# Patient Record
Sex: MALE | Race: WHITE
[De-identification: names, ages, dates, MRNs, and addresses within clinical notes are randomized per-mention and may not be internally consistent; named-entity substitution may affect disease eponyms.]

---

## 2018-09-17 ENCOUNTER — HOSPITAL ENCOUNTER (OUTPATIENT)
Dept: HOSPITAL 74 - JASU-ENDO | Age: 58
Discharge: HOME | End: 2018-09-17
Attending: INTERNAL MEDICINE
Payer: COMMERCIAL

## 2018-09-17 VITALS — DIASTOLIC BLOOD PRESSURE: 80 MMHG | HEART RATE: 50 BPM | SYSTOLIC BLOOD PRESSURE: 119 MMHG

## 2018-09-17 VITALS — BODY MASS INDEX: 25.4 KG/M2

## 2018-09-17 VITALS — TEMPERATURE: 97.6 F

## 2018-09-17 DIAGNOSIS — K64.4: ICD-10-CM

## 2018-09-17 DIAGNOSIS — D12.3: ICD-10-CM

## 2018-09-17 DIAGNOSIS — Z12.11: Primary | ICD-10-CM

## 2018-09-17 DIAGNOSIS — D12.0: ICD-10-CM

## 2018-09-17 PROCEDURE — 0DBH8ZX EXCISION OF CECUM, VIA NATURAL OR ARTIFICIAL OPENING ENDOSCOPIC, DIAGNOSTIC: ICD-10-PCS | Performed by: INTERNAL MEDICINE

## 2018-09-17 PROCEDURE — 0DBL8ZX EXCISION OF TRANSVERSE COLON, VIA NATURAL OR ARTIFICIAL OPENING ENDOSCOPIC, DIAGNOSTIC: ICD-10-PCS | Performed by: INTERNAL MEDICINE

## 2018-09-20 NOTE — PATH
Surgical Pathology Report



Patient Name:  SHINE GREEN

Accession #:  [id number]

[facility]. Rec. #:  [id number]                                                        

   /Age/Gender:  1960 (Age: 58) / M

Account:  [id number]                                                          

             Location: ASU-ENDOSCOPY

Taken:  2018

Received:  2018

Reported:  2018

Physicians:  Ang Mcfarland D.O.

  



Specimen(s) Received

A: BX CECAL POLYP 

B: BX PROXIMAL TRANSVERSE COLON POLYP 

C: BX ANAL PAPILLA 





Clinical History

Colon screening, hemorrhage of rectum

Postoperative diagnosis: Polyps, anal papilla







Final Diagnosis

A. CECUM, POLYP, POLYPECTOMY:

POLYPOID COLONIC MUCOSA WITH SMALL LYMPHOID AGGREGATE.



B. PROXIMAL TRANSVERSE COLON, POLYP, POLYPECTOMY:

TUBULAR ADENOMA.



C. ANAL PAPILLA, BIOPSY:

POLYPOID SQUAMOUS MUCOSA CONSISTENT WITH HYPERTROPHIED PAPILLA.

SEE COMMENT.



Comment: HPV in situ hybridization (low risk, subtypes 6/11) performed and

interpreted at [place], [place] ([id number]) is negative.







***Electronically Signed***

Keri Khan M.D.





Gross Description

A.  Received in formalin, labeled "biopsy cecal polyp" are 2 tan, irregular

portions of soft tissue averaging 0.3 cm. in greatest dimension. The specimens

are submitted in toto in one cassette.



B.  Received in formalin, labeled "biopsy proximal transverse colon polyp" is a

tan, irregular portion of soft tissue measuring 0.4 cm. in greatest dimension.

The specimen is submitted in toto in one cassette.



C. Received in formalin labeled "biopsy anal papilla," is a 0.7 x 0.5 x 0.1 cm

aggregate of tan soft tissue fragments. The formalin is filtered and the

specimen is entirely submitted in one cassette.

DL/2018



saudi/2018

## 2022-06-17 ENCOUNTER — HOSPITAL ENCOUNTER (OUTPATIENT)
Dept: HOSPITAL 74 - FASU | Age: 62
Discharge: HOME | End: 2022-06-17
Attending: ORTHOPAEDIC SURGERY
Payer: COMMERCIAL

## 2022-06-17 VITALS — SYSTOLIC BLOOD PRESSURE: 121 MMHG | HEART RATE: 57 BPM | DIASTOLIC BLOOD PRESSURE: 77 MMHG

## 2022-06-17 VITALS — TEMPERATURE: 97.1 F

## 2022-06-17 VITALS — BODY MASS INDEX: 24.9 KG/M2

## 2022-06-17 DIAGNOSIS — M67.813: ICD-10-CM
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
## 2022-06-17 DIAGNOSIS — M75.111: Primary | ICD-10-CM

## 2022-06-17 DIAGNOSIS — M75.01: ICD-10-CM

## 2022-06-17 DIAGNOSIS — S43.431A: ICD-10-CM

## 2022-06-17 DIAGNOSIS — M65.811: ICD-10-CM

## 2022-06-17 DIAGNOSIS — M94.211: ICD-10-CM
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
## 2022-06-17 PROCEDURE — 0LQ14ZZ REPAIR RIGHT SHOULDER TENDON, PERCUTANEOUS ENDOSCOPIC APPROACH: ICD-10-PCS | Performed by: ORTHOPAEDIC SURGERY

## 2022-06-17 PROCEDURE — 0LS30ZZ REPOSITION RIGHT UPPER ARM TENDON, OPEN APPROACH: ICD-10-PCS | Performed by: ORTHOPAEDIC SURGERY

## 2022-06-17 PROCEDURE — 0RBJ4ZZ EXCISION OF RIGHT SHOULDER JOINT, PERCUTANEOUS ENDOSCOPIC APPROACH: ICD-10-PCS | Performed by: ORTHOPAEDIC SURGERY

## 2022-06-17 PROCEDURE — 0RNJ4ZZ RELEASE RIGHT SHOULDER JOINT, PERCUTANEOUS ENDOSCOPIC APPROACH: ICD-10-PCS | Performed by: ORTHOPAEDIC SURGERY

## 2023-04-11 ENCOUNTER — OFFICE (OUTPATIENT)
Dept: URBAN - METROPOLITAN AREA CLINIC 121 | Facility: CLINIC | Age: 63
Setting detail: OPHTHALMOLOGY
End: 2023-04-11
Payer: COMMERCIAL

## 2023-04-11 DIAGNOSIS — H35.363: ICD-10-CM

## 2023-04-11 DIAGNOSIS — E11.9: ICD-10-CM

## 2023-04-11 DIAGNOSIS — H25.13: ICD-10-CM

## 2023-04-11 DIAGNOSIS — H16.223: ICD-10-CM

## 2023-04-11 DIAGNOSIS — H35.033: ICD-10-CM

## 2023-04-11 DIAGNOSIS — H40.013: ICD-10-CM

## 2023-04-11 PROCEDURE — 92012 INTRM OPH EXAM EST PATIENT: CPT | Performed by: OPHTHALMOLOGY

## 2023-04-11 PROCEDURE — 92134 CPTRZ OPH DX IMG PST SGM RTA: CPT | Performed by: OPHTHALMOLOGY

## 2023-04-11 ASSESSMENT — REFRACTION_CURRENTRX
OD_OVR_VA: 20/
OS_OVR_VA: 20/
OS_VPRISM_DIRECTION: SV
OD_SPHERE: +2.50
OD_VPRISM_DIRECTION: SV
OS_SPHERE: +2.50

## 2023-04-11 ASSESSMENT — KERATOMETRY
OD_AXISANGLE_DEGREES: 069
OD_K1POWER_DIOPTERS: 45.25
OS_AXISANGLE_DEGREES: 124
METHOD_AUTO_MANUAL: AUTO
OS_K1POWER_DIOPTERS: 45.25
OS_K2POWER_DIOPTERS: 46.25
OD_K2POWER_DIOPTERS: 46.00

## 2023-04-11 ASSESSMENT — SUPERFICIAL PUNCTATE KERATITIS (SPK)
OS_SPK: T
OD_SPK: T

## 2023-04-11 ASSESSMENT — PACHYMETRY
OS_CT_CORRECTION: -3
OD_CT_UM: 574
OS_CT_UM: 585
OD_CT_CORRECTION: -2

## 2023-04-11 ASSESSMENT — CONFRONTATIONAL VISUAL FIELD TEST (CVF)
OS_FINDINGS: FULL
OD_FINDINGS: FULL

## 2023-04-11 ASSESSMENT — AXIALLENGTH_DERIVED
OS_AL: 22.0886
OD_AL: 22.1718

## 2023-04-11 ASSESSMENT — TONOMETRY
OD_IOP_MMHG: 16
OS_IOP_MMHG: 16

## 2023-04-11 ASSESSMENT — REFRACTION_AUTOREFRACTION
OD_CYLINDER: +0.25
OD_AXIS: 048
OS_SPHERE: +1.50
OD_SPHERE: +1.75
OS_CYLINDER: +1.00
OS_AXIS: 142

## 2023-04-11 ASSESSMENT — SPHEQUIV_DERIVED
OD_SPHEQUIV: 1.875
OS_SPHEQUIV: 2

## 2023-04-11 ASSESSMENT — VISUAL ACUITY
OS_BCVA: 20/60
OD_BCVA: 20/70

## 2023-12-22 ENCOUNTER — OFFICE (OUTPATIENT)
Dept: URBAN - METROPOLITAN AREA CLINIC 30 | Facility: CLINIC | Age: 63
Setting detail: OPHTHALMOLOGY
End: 2023-12-22
Payer: COMMERCIAL

## 2023-12-22 DIAGNOSIS — H40.003: ICD-10-CM

## 2023-12-22 DIAGNOSIS — H52.4: ICD-10-CM

## 2023-12-22 DIAGNOSIS — E11.9: ICD-10-CM

## 2023-12-22 DIAGNOSIS — H11.153: ICD-10-CM

## 2023-12-22 PROCEDURE — 92004 COMPRE OPH EXAM NEW PT 1/>: CPT | Performed by: OPHTHALMOLOGY

## 2023-12-22 PROCEDURE — 92015 DETERMINE REFRACTIVE STATE: CPT | Performed by: OPHTHALMOLOGY

## 2023-12-22 PROCEDURE — 92020 GONIOSCOPY: CPT | Performed by: OPHTHALMOLOGY

## 2023-12-22 PROCEDURE — 92250 FUNDUS PHOTOGRAPHY W/I&R: CPT | Performed by: OPHTHALMOLOGY

## 2023-12-22 ASSESSMENT — REFRACTION_MANIFEST
OD_ADD: +1.50
OS_SPHERE: +1.25
OS_ADD: +1.50
OS_VA1: 20/20
OD_VA1: 20/25
OD_SPHERE: +1.50

## 2023-12-22 ASSESSMENT — SPHEQUIV_DERIVED: OS_SPHEQUIV: 1.75

## 2023-12-22 ASSESSMENT — REFRACTION_AUTOREFRACTION
OS_SPHERE: +1.50
OD_SPHERE: +2.00
OS_CYLINDER: +0.50
OS_AXIS: 160

## 2023-12-22 ASSESSMENT — REFRACTION_CURRENTRX
OD_OVR_VA: 20/
OS_OVR_VA: 20/
OD_SPHERE: +2.50
OS_SPHERE: +2.50

## 2023-12-22 ASSESSMENT — CONFRONTATIONAL VISUAL FIELD TEST (CVF)
OD_FINDINGS: FULL
OS_FINDINGS: FULL

## 2024-06-18 ENCOUNTER — OFFICE (OUTPATIENT)
Dept: URBAN - METROPOLITAN AREA CLINIC 121 | Facility: CLINIC | Age: 64
Setting detail: OPHTHALMOLOGY
End: 2024-06-18
Payer: MEDICARE

## 2024-06-18 DIAGNOSIS — H35.033: ICD-10-CM

## 2024-06-18 DIAGNOSIS — H35.363: ICD-10-CM

## 2024-06-18 DIAGNOSIS — H25.13: ICD-10-CM

## 2024-06-18 DIAGNOSIS — H40.013: ICD-10-CM

## 2024-06-18 DIAGNOSIS — E11.9: ICD-10-CM

## 2024-06-18 DIAGNOSIS — H16.223: ICD-10-CM

## 2024-06-18 PROCEDURE — 99214 OFFICE O/P EST MOD 30 MIN: CPT | Performed by: OPHTHALMOLOGY

## 2024-06-18 PROCEDURE — 92250 FUNDUS PHOTOGRAPHY W/I&R: CPT | Performed by: OPHTHALMOLOGY

## 2024-06-18 ASSESSMENT — CONFRONTATIONAL VISUAL FIELD TEST (CVF)
OD_FINDINGS: FULL
OS_FINDINGS: FULL

## 2024-12-06 ENCOUNTER — OFFICE (OUTPATIENT)
Facility: LOCATION | Age: 64
Setting detail: OPHTHALMOLOGY
End: 2024-12-06
Payer: MEDICARE

## 2024-12-06 DIAGNOSIS — H16.223: ICD-10-CM

## 2024-12-06 DIAGNOSIS — E11.9: ICD-10-CM

## 2024-12-06 DIAGNOSIS — H40.013: ICD-10-CM

## 2024-12-06 DIAGNOSIS — H35.363: ICD-10-CM

## 2024-12-06 DIAGNOSIS — H35.033: ICD-10-CM

## 2024-12-06 DIAGNOSIS — H25.13: ICD-10-CM

## 2024-12-06 PROCEDURE — 92134 CPTRZ OPH DX IMG PST SGM RTA: CPT | Performed by: OPHTHALMOLOGY

## 2024-12-06 PROCEDURE — 99213 OFFICE O/P EST LOW 20 MIN: CPT | Performed by: OPHTHALMOLOGY

## 2024-12-06 ASSESSMENT — REFRACTION_AUTOREFRACTION
OS_AXIS: 147
OD_AXIS: 180
OS_SPHERE: +1.75
OS_CYLINDER: +0.50
OD_SPHERE: +2.00
OD_CYLINDER: 0.00

## 2024-12-06 ASSESSMENT — SUPERFICIAL PUNCTATE KERATITIS (SPK)
OS_SPK: T
OD_SPK: T

## 2024-12-06 ASSESSMENT — PACHYMETRY
OS_CT_CORRECTION: -3
OD_CT_UM: 574
OS_CT_UM: 585
OD_CT_CORRECTION: -2

## 2024-12-06 ASSESSMENT — REFRACTION_CURRENTRX
OS_VPRISM_DIRECTION: SV
OD_OVR_VA: 20/
OS_OVR_VA: 20/
OS_SPHERE: +2.50
OD_SPHERE: +2.50
OD_VPRISM_DIRECTION: SV

## 2024-12-06 ASSESSMENT — KERATOMETRY
OD_K2POWER_DIOPTERS: 45.75
METHOD_AUTO_MANUAL: AUTO
OD_K1POWER_DIOPTERS: 45.25
OS_K1POWER_DIOPTERS: 45.50
OS_AXISANGLE_DEGREES: 119
OS_K2POWER_DIOPTERS: 46.50
OD_AXISANGLE_DEGREES: 064

## 2024-12-06 ASSESSMENT — TONOMETRY
OD_IOP_MMHG: 16
OS_IOP_MMHG: 16

## 2024-12-06 ASSESSMENT — CONFRONTATIONAL VISUAL FIELD TEST (CVF)
OD_FINDINGS: FULL
OS_FINDINGS: FULL

## 2024-12-06 ASSESSMENT — VISUAL ACUITY
OD_BCVA: 20/50
OS_BCVA: 20/100

## 2025-06-16 ENCOUNTER — OFFICE (OUTPATIENT)
Facility: LOCATION | Age: 65
Setting detail: OPHTHALMOLOGY
End: 2025-06-16
Payer: MEDICARE

## 2025-06-16 DIAGNOSIS — H35.013: ICD-10-CM

## 2025-06-16 DIAGNOSIS — H16.223: ICD-10-CM

## 2025-06-16 DIAGNOSIS — H40.013: ICD-10-CM

## 2025-06-16 DIAGNOSIS — H35.363: ICD-10-CM

## 2025-06-16 DIAGNOSIS — H25.13: ICD-10-CM

## 2025-06-16 DIAGNOSIS — E11.9: ICD-10-CM

## 2025-06-16 PROCEDURE — 92250 FUNDUS PHOTOGRAPHY W/I&R: CPT | Performed by: OPHTHALMOLOGY

## 2025-06-16 PROCEDURE — 92014 COMPRE OPH EXAM EST PT 1/>: CPT | Performed by: OPHTHALMOLOGY

## 2025-06-16 ASSESSMENT — KERATOMETRY
METHOD_AUTO_MANUAL: AUTO
OS_K1POWER_DIOPTERS: 45.25
OD_AXISANGLE_DEGREES: 058
OS_K2POWER_DIOPTERS: 46.25
OD_K2POWER_DIOPTERS: 45.75
OS_AXISANGLE_DEGREES: 133
OD_K1POWER_DIOPTERS: 45.00

## 2025-06-16 ASSESSMENT — REFRACTION_AUTOREFRACTION
OD_AXIS: 26
OS_CYLINDER: +0.75
OS_SPHERE: +2.00
OD_SPHERE: +2.25
OS_AXIS: 157
OD_CYLINDER: +0.50

## 2025-06-16 ASSESSMENT — PACHYMETRY
OS_CT_CORRECTION: -3
OD_CT_UM: 574
OD_CT_CORRECTION: -2
OS_CT_UM: 585

## 2025-06-16 ASSESSMENT — TONOMETRY
OS_IOP_MMHG: 16
OD_IOP_MMHG: 16

## 2025-06-16 ASSESSMENT — SUPERFICIAL PUNCTATE KERATITIS (SPK)
OS_SPK: T
OD_SPK: T

## 2025-06-16 ASSESSMENT — REFRACTION_CURRENTRX
OS_VPRISM_DIRECTION: SV
OS_SPHERE: +2.50
OD_OVR_VA: 20/
OD_VPRISM_DIRECTION: SV
OD_SPHERE: +2.50
OS_OVR_VA: 20/

## 2025-06-16 ASSESSMENT — CONFRONTATIONAL VISUAL FIELD TEST (CVF)
OS_FINDINGS: FULL
OD_FINDINGS: FULL

## 2025-06-16 ASSESSMENT — VISUAL ACUITY
OD_BCVA: 20/60
OS_BCVA: 20/150